# Patient Record
(demographics unavailable — no encounter records)

---

## 2025-04-01 NOTE — END OF VISIT
[FreeTextEntry3] : I, Dr. Adan, personally performed the evaluation and management (E/M) services for this established patient who presents today with (a) new problem(s)/exacerbation of (an) existing condition(s).  That E/M includes conducting the examination, assessing all new/exacerbated conditions, and establishing a new plan of care.  Today, my PA, Maki Zhou, was here to observe my evaluation and management services for this new problem/exacerbated condition to be followed going forward.

## 2025-04-01 NOTE — PHYSICAL EXAM
[No Acute Distress] : no acute distress [Normal Sclera/Conjunctiva] : normal sclera/conjunctiva [Normal Outer Ear/Nose] : the outer ears and nose were normal in appearance [Supple] : supple [Normal Rate] : normal rate  [Regular Rhythm] : with a regular rhythm [Normal] : affect was normal and insight and judgment were intact

## 2025-04-01 NOTE — HISTORY OF PRESENT ILLNESS
[FreeTextEntry1] : annual [de-identified] : Pt is a 76 y/o M with PMHx of HTN, GOUT who presents to the office today for an annual physical.   Pt reports he is doing well.  HCM - Covid vaccine: declines - PNA vaccine: declines - Influenza vaccine: declines - Shingrix vaccine: declines - Tdap vaccine: declines - RSV vaccine: declines - Colonoscopy: needs  - Ophthalmology: UTD - Dentist: UTD - Derm: UTD - Diet: 2 meals a day, mediterranean diet - Exercise: gym once a week, golf Friday, saturday, sunday

## 2025-04-01 NOTE — HISTORY OF PRESENT ILLNESS
[FreeTextEntry1] : annual [de-identified] : Pt is a 76 y/o M with PMHx of HTN, GOUT who presents to the office today for an annual physical.   Pt reports he is doing well.  HCM - Covid vaccine: declines - PNA vaccine: declines - Influenza vaccine: declines - Shingrix vaccine: declines - Tdap vaccine: declines - RSV vaccine: declines - Colonoscopy: needs  - Ophthalmology: UTD - Dentist: UTD - Derm: UTD - Diet: 2 meals a day, mediterranean diet - Exercise: gym once a week, golf Friday, saturday, sunday

## 2025-04-01 NOTE — HEALTH RISK ASSESSMENT
[Never] : Never [Patient reported colonoscopy was normal] : Patient reported colonoscopy was normal [Fully functional (bathing, dressing, toileting, transferring, walking, feeding)] : Fully functional (bathing, dressing, toileting, transferring, walking, feeding) [Fully functional (using the telephone, shopping, preparing meals, housekeeping, doing laundry, using] : Fully functional and needs no help or supervision to perform IADLs (using the telephone, shopping, preparing meals, housekeeping, doing laundry, using transportation, managing medications and managing finances) [Reports changes in hearing] : Reports no changes in hearing [Reports changes in vision] : Reports no changes in vision [Reports changes in dental health] : Reports no changes in dental health